# Patient Record
Sex: MALE | Race: WHITE | Employment: FULL TIME | URBAN - METROPOLITAN AREA
[De-identification: names, ages, dates, MRNs, and addresses within clinical notes are randomized per-mention and may not be internally consistent; named-entity substitution may affect disease eponyms.]

---

## 2018-11-04 ENCOUNTER — HOSPITAL ENCOUNTER (EMERGENCY)
Facility: CLINIC | Age: 33
Discharge: HOME | End: 2018-11-04
Attending: PHYSICIAN ASSISTANT | Admitting: PHYSICIAN ASSISTANT
Payer: COMMERCIAL

## 2018-11-04 VITALS
RESPIRATION RATE: 16 BRPM | HEART RATE: 69 BPM | OXYGEN SATURATION: 96 % | DIASTOLIC BLOOD PRESSURE: 107 MMHG | SYSTOLIC BLOOD PRESSURE: 157 MMHG | TEMPERATURE: 97.8 F

## 2018-11-04 DIAGNOSIS — S01.511A LIP LACERATION, INITIAL ENCOUNTER: ICD-10-CM

## 2018-11-04 PROCEDURE — 12013 RPR F/E/E/N/L/M 2.6-5.0 CM: CPT

## 2018-11-04 PROCEDURE — 99283 EMERGENCY DEPT VISIT LOW MDM: CPT

## 2018-11-04 RX ORDER — PENICILLIN V POTASSIUM 500 MG/1
500 TABLET, FILM COATED ORAL 4 TIMES DAILY
Qty: 20 TABLET | Refills: 0 | Status: SHIPPED | OUTPATIENT
Start: 2018-11-04 | End: 2018-11-09

## 2018-11-04 RX ORDER — BUPIVACAINE HYDROCHLORIDE 5 MG/ML
INJECTION, SOLUTION PERINEURAL
Status: DISCONTINUED
Start: 2018-11-04 | End: 2018-11-04 | Stop reason: HOSPADM

## 2018-11-04 ASSESSMENT — ENCOUNTER SYMPTOMS: WOUND: 1

## 2018-11-04 NOTE — ED AVS SNAPSHOT
[facility] Emergency Department    [address]    [facility] [address]    Phone:  [phone]    Fax:  [phone]                                       Juan Jose Thomas   MRN: [id number]    Department:  [facility] Emergency Department   Date of Visit:  11/4/2018           After Visit Summary Signature Page     I have received my discharge instructions, and my questions have been answered. I have discussed any challenges I see with this plan with the nurse or doctor.    ..........................................................................................................................................  Patient/Patient Representative Signature      ..........................................................................................................................................  Patient Representative Print Name and Relationship to Patient    ..................................................               ................................................  Date                                   Time    ..........................................................................................................................................  Reviewed by Signature/Title    ...................................................              ..............................................  Date                                               Time          22EPIC Rev 08/18

## 2018-11-04 NOTE — ED AVS SNAPSHOT
[facility] Emergency Department    [address] [place] [address]    Phone:  [phone]    Fax:  [phone]                                       Juan Jose Thomas   MRN: [id number]    Department:  [facility] Emergency Department   Date of Visit:  11/4/2018           Patient Information     Date Of Birth          1985        Your diagnoses for this visit were:     Lip laceration, initial encounter        You were seen by Ivonne Delvalle PA-C.      Follow-up Information     Follow up with [facility], [place].    Specialty:  Family Practice    Why:  in 5 days for suture removal    Contact information:    [address]  [place] [address]  [phone]          Follow up with [facility] Emergency Department.    Specialty:  EMERGENCY MEDICINE    Why:  If symptoms worsen - if you develop increased swelling, pain, redness, drainage, or other signs of infection    Contact information:    [address] [place] [id number] [phone]        Discharge Instructions         Lip or Mouth Laceration  A laceration is a cut through the skin. When the cut is on the outside of the lip, it may be closed with stitches. Cuts inside the mouth may be stitched or left open, depending on the size. When stitches are used in the mouth, they are usually the kind that dissolve on their own.  A tetanus shot may be given if you are not current on this vaccine and the object that caused the cut may lead to tetanus.    Home care    If you were given an antibiotic to prevent infection, don't stop taking this medicine until you have finished it all or the healthcare provider tells you to stop.    The healthcare provider may prescribe medicines for pain. Follow instructions for taking these medicines.     Follow the healthcare provider s instructions on how to care for the cut.    Wash your hands with soap and warm water before and after caring for  your cut. This helps prevent infection.    If the cut is inside your mouth, clean the wound by rinsing your mouth after each meal and at bedtime with a mixture of equal parts water and hydrogen peroxide. (Don't swallow!) Or you can use a cotton swab to apply hydrogen peroxide directly onto the cut. You may also be prescribed chlorohexidine to swish and spit to keep the wound clean.    Mouth wounds can cause pain when chewing. Soft foods can help with this. If needed, use an over-the-counter numbing solution for pain relief, such as one for teething babies. Put this directly to the wound with a cotton-tip swab or your clean finger.    If the wound is bandaged, leave the original bandage in place for 24 hours. Replace it if it becomes wet or dirty. After 24 hours, change it once a day or as directed.    If the cut is on the outside of the lip and stitches were used, you may shower as usual after the first 24 hours, but don't put your head under water or swim until the sutures are removed. After removing the bandage, wash the area with soap and water. Use a wet cotton swab to loosen and remove any blood or crust that forms. After cleaning, keep the wound clean and dry. Talk with your healthcare provider before applying any antibiotic ointment to the wound. You may apply an adhesive bandage or leave the wound open. Unless told otherwise, stitches on the inside of the mouth will likely dissolve on their own.  Follow-up care  Follow up with your healthcare provider, or as directed. If you have stitches that don't dissolve on their own, return as directed to have them removed.  When to seek medical advice  Call your healthcare provider right away if any of these occur:    Wound bleeding not controlled by direct pressure    Signs of infection, including increasing pain in the wound, increasing wound redness or swelling, or pus or bad odor coming from the wound    Fever of 100.4 F (38. C) or higher, or as directed by your  healthcare provider    Stitches come apart or fall out     Wound edges reopen    Wound changes colors    Numbness around the wound   Date Last Reviewed: 7/1/2017 2000-2017 [employer]. [address] [place] [address], [place] [address]. All rights reserved. This information is not intended as a substitute for professional medical care. Always follow your healthcare professional's instructions.          24 Hour Appointment Hotline       To make an appointment at any [facility], call [phone] ([phone]). If you don't have a family doctor or clinic, we will help you find one. [facility] clinics are conveniently located to serve the needs of you and your family.             Review of your medicines      START taking        Dose / Directions Last dose taken    penicillin V potassium 500 MG tablet   Commonly known as:  VEETID   Dose:  500 mg   Quantity:  20 tablet        Take 1 tablet (500 mg) by mouth 4 times daily for 5 days   Refills:  0                Prescriptions were sent or printed at these locations (1 Prescription)                   Other Prescriptions                Printed at Department/Unit printer (1 of 1)         penicillin V potassium (VEETID) 500 MG tablet                Orders Needing Specimen Collection     None      Pending Results     No orders found from 11/2/2018 to 11/5/2018.            Pending Culture Results     No orders found from 11/2/2018 to 11/5/2018.            Pending Results Instructions     If you had any lab results that were not finalized at the time of your Discharge, you can call the ED Lab Result RN at [phone]. You will be contacted by this team for any positive Lab results or changes in treatment. The nurses are available 7 days a week from 10A to 6:30P.  You can leave a message 24 hours per day and they will return your call.        Test Results From Your Hospital Stay               Clinical Quality Measure: Blood Pressure Screening     Your blood  "pressure was checked while you were in the emergency department today. The last reading we obtained was  BP: (!) 157/107 . Please read the guidelines below about what these numbers mean and what you should do about them.  If your systolic blood pressure (the top number) is less than 120 and your diastolic blood pressure (the bottom number) is less than 80, then your blood pressure is normal. There is nothing more that you need to do about it.  If your systolic blood pressure (the top number) is 120-139 or your diastolic blood pressure (the bottom number) is 80-89, your blood pressure may be higher than it should be. You should have your blood pressure rechecked within a year by a primary care provider.  If your systolic blood pressure (the top number) is 140 or greater or your diastolic blood pressure (the bottom number) is 90 or greater, you may have high blood pressure. High blood pressure is treatable, but if left untreated over time it can put you at risk for heart attack, stroke, or kidney failure. You should have your blood pressure rechecked by a primary care provider within the next 4 weeks.  If your provider in the emergency department today gave you specific instructions to follow-up with your doctor or provider even sooner than that, you should follow that instruction and not wait for up to 4 weeks for your follow-up visit.        Thank you for choosing [facility]       Thank you for choosing [facility] for your care. Our goal is always to provide you with excellent care. Hearing back from our patients is one way we can continue to improve our services. Please take a few minutes to complete the written survey that you may receive in the mail after you visit with us. Thank you!        [employer] Information     [employer] lets you send messages to your doctor, view your test results, renew your prescriptions, schedule appointments and more. To sign up, go to [email or web address] . Click on \"Log in\" on the left " "side of the screen, which will take you to the Welcome page. Then click on \"Sign up Now\" on the right side of the page.     You will be asked to enter the access code listed below, as well as some personal information. Please follow the directions to create your username and password.     Your access code is: [email or web address]  Expires: 2019  8:58 PM     Your access code will  in 90 days. If you need help or a new code, please call your [facility] clinic or [phone].        Care EveryWhere ID     This is your Care EveryWhere ID. This could be used by other organizations to access your [facility] medical records  [id number]        Equal Access to Services     ROSA JACOBS : Arline Ochoa, rena danielson, lea delgado, ángel smith. So [facility] [phone].    ATENCIÓN: Si habla español, tiene a phelps disposición servicios gratuitos de asistencia lingüística. Llame al [phone].    We comply with applicable federal civil rights laws and [place] laws. We do not discriminate on the basis of race, color, national origin, age, disability, sex, sexual orientation, or gender identity.            After Visit Summary       This is your record. Keep this with you and show to your community pharmacist(s) and doctor(s) at your next visit.                  "

## 2018-11-05 NOTE — ED PROVIDER NOTES
History     Chief Complaint:  Facial lacertation    HPI   Juan Jose Thomas is a 33 year old male who presents with laceration. The patient states that he was playing with a dog and the dog scratched his right upper lip and caused a laceration. Bleeding was controlled prior to arriving in the ED. The patient was not bitten by the dog. He does not know the immunization status of the dog, but the dog is his friend's pet. the patient received a tetanus shot yesterday.     Allergies:  No known allergies     Medications:    The patient is currently on no regular medications.    Past Medical History:    The patient denies any significant past medical history.    Past Surgical History:    The patient does not have any pertinent past surgical history.    Family History:    No past pertinent family history.    Social History:  Patient presents with mother     Review of Systems   Skin: Positive for wound.   All other systems reviewed and are negative.      Physical Exam   First Vitals:  BP: (!) 157/107  Pulse: 69  Heart Rate: 69  Temp: 97.8  F (36.6  C)  Resp: 16  SpO2: 96 %      Physical Exam  Head: No external signs of trauma noted to head or face. Upper lip laceration.  Eyes: Pupils are equal, round, and reactive to light. Conjunctiva normal.  ENT: MMM. Normal Voice. Nose normal.   No intraoral lacerations appreciated. Dentition intact.   Neck: normal ROM..   Cardiovascular: Normal rate  Respiratory: Effort normal. No respiratory distress.  Musculoskeletal: No deformities appreciated. Normal ROM. No edema noted.  Neurological: Alert and Oriented x 3. Speech normal. Moves all extremities equally.   Psychiatric: Appropriate mood, affect, and behavior.   Skin: Skin is warm and dry. 3.5 cm laceration extending from the lateral aspect of the right nares down to the upper lip, including the Vermillion border, but not extending much past that. It is not through and through.         Emergency Department Course   Procedures:      Laceration Repair      LACERATION:  A simple clean 3.5 cm laceration.    LOCATION:  Right upper lip including Vermillion border.    ANESTHESIA:  Local using 0.5% Bupivacaine total of 2 mLs.    PREPARATION:  Irrigation and Scrubbing with Normal Saline and Shur Clens.    DEBRIDEMENT:  no debridement.    CLOSURE:  Wound was closed with One Layer.  Skin closed with 7 x 6.0 Ethylon sutures.    Emergency Department Course:  Nursing notes and vitals reviewed.  : I performed an exam of the patient as documented above.     : I rechecked the patient. I performed a laceration repair(see procedure note). Findings and plan explained to the Patient. Patient discharged home with instructions regarding supportive care, medications, and reasons to return. The importance of close follow-up was reviewed.       Impression & Plan      Medical Decision Makin year old male presenting with upper lip laceration. There is no evidence of intraoral laceration. No evidence of foreign body on exam. There is no bony facial injury noted. Facial sensation and expressions intact. The laceration was anesthetized and extensively irrigated. His mustache did require trimming in order to assist proper alignment of the wound edges. The laceration was then repaired as noted above. A single stitch was initially placed at the Vermillion border to approximate the edges, paying careful attention that the edges were well aligned for cosmetic purposes. There was no sutures required to the lip itself. The remainder of the laceration was sutured as noted above. The part of the laceration closest to the nares was superficial and did not require repair. Given the extent of the laceration, I will prescribe prophylactic antibiotics. Patient was instructed to watch for signs of infection such as redness, swelling, drainage, fever, and will return to the ED if those develop. Otherwise he can follow-up with his PCP in 5 days for suture removal.      Diagnosis:    ICD-10-CM    1. Lip laceration, initial encounter S01.511A        Disposition:  discharged to home    Discharge Medications:  New Prescriptions    PENICILLIN V POTASSIUM (VEETID) 500 MG TABLET    Take 1 tablet (500 mg) by mouth 4 times daily for 5 days      Davonte CASE, am serving as a scribe on 11/4/2018 at 7:57 PM to personally document services performed by Ivonne Delvalle PA-C based on my observations and the provider's statements to me.       Davonte Bhakta  11/4/2018   [facility] EMERGENCY DEPARTMENT       Ivonne Delvalle PA-C  11/04/18 [id number]

## 2018-11-05 NOTE — ED TRIAGE NOTES
Pt presents with laceration to his lip from a dog claw, unsure of shot status of dog but tetanus is up to date. Pt alert, oriented x3. ABCs intact. Bleeding controlled PTA

## 2018-11-05 NOTE — DISCHARGE INSTRUCTIONS
Lip or Mouth Laceration  A laceration is a cut through the skin. When the cut is on the outside of the lip, it may be closed with stitches. Cuts inside the mouth may be stitched or left open, depending on the size. When stitches are used in the mouth, they are usually the kind that dissolve on their own.  A tetanus shot may be given if you are not current on this vaccine and the object that caused the cut may lead to tetanus.    Home care    If you were given an antibiotic to prevent infection, don't stop taking this medicine until you have finished it all or the healthcare provider tells you to stop.    The healthcare provider may prescribe medicines for pain. Follow instructions for taking these medicines.     Follow the healthcare provider s instructions on how to care for the cut.    Wash your hands with soap and warm water before and after caring for your cut. This helps prevent infection.    If the cut is inside your mouth, clean the wound by rinsing your mouth after each meal and at bedtime with a mixture of equal parts water and hydrogen peroxide. (Don't swallow!) Or you can use a cotton swab to apply hydrogen peroxide directly onto the cut. You may also be prescribed chlorohexidine to swish and spit to keep the wound clean.    Mouth wounds can cause pain when chewing. Soft foods can help with this. If needed, use an over-the-counter numbing solution for pain relief, such as one for teething babies. Put this directly to the wound with a cotton-tip swab or your clean finger.    If the wound is bandaged, leave the original bandage in place for 24 hours. Replace it if it becomes wet or dirty. After 24 hours, change it once a day or as directed.    If the cut is on the outside of the lip and stitches were used, you may shower as usual after the first 24 hours, but don't put your head under water or swim until the sutures are removed. After removing the bandage, wash the area with soap and water. Use a wet  cotton swab to loosen and remove any blood or crust that forms. After cleaning, keep the wound clean and dry. Talk with your healthcare provider before applying any antibiotic ointment to the wound. You may apply an adhesive bandage or leave the wound open. Unless told otherwise, stitches on the inside of the mouth will likely dissolve on their own.  Follow-up care  Follow up with your healthcare provider, or as directed. If you have stitches that don't dissolve on their own, return as directed to have them removed.  When to seek medical advice  Call your healthcare provider right away if any of these occur:    Wound bleeding not controlled by direct pressure    Signs of infection, including increasing pain in the wound, increasing wound redness or swelling, or pus or bad odor coming from the wound    Fever of 100.4 F (38. C) or higher, or as directed by your healthcare provider    Stitches come apart or fall out     Wound edges reopen    Wound changes colors    Numbness around the wound   Date Last Reviewed: 7/1/2017 2000-2017 [employer]. [address] [place] [address], [place] [address]. All rights reserved. This information is not intended as a substitute for professional medical care. Always follow your healthcare professional's instructions.

## 2021-01-15 ENCOUNTER — HEALTH MAINTENANCE LETTER (OUTPATIENT)
Age: 36
End: 2021-01-15

## 2021-10-24 ENCOUNTER — HEALTH MAINTENANCE LETTER (OUTPATIENT)
Age: 36
End: 2021-10-24

## 2022-02-13 ENCOUNTER — HEALTH MAINTENANCE LETTER (OUTPATIENT)
Age: 37
End: 2022-02-13

## 2022-10-16 ENCOUNTER — HEALTH MAINTENANCE LETTER (OUTPATIENT)
Age: 37
End: 2022-10-16

## 2023-03-26 ENCOUNTER — HEALTH MAINTENANCE LETTER (OUTPATIENT)
Age: 38
End: 2023-03-26